# Patient Record
(demographics unavailable — no encounter records)

---

## 2024-12-03 NOTE — DATA REVIEWED
[de-identified] :  right tibia 2 views radiographs were obtained  and independently reviewed during today's visit 12/03/24 : mid shaft tibia fracture . Bones are in acceptable alignment. Joint spaces are preserved

## 2024-12-03 NOTE — HISTORY OF PRESENT ILLNESS
[FreeTextEntry1] : Truman  is a pleasant 12 yo male who came today to my office with his parents  for evaluation of right  tibia shaft fracture. He fell down on 11/27/24 while ice skating injured his right  leg t. He immediate experienced pain with any attempt of touching or weight bearing  [facility]. Xray was done and displaced  tibia shaft  fracture was diagnosed. He was placed in a long leg cast. He was instructed to follow with Peds Ortho. He came today for further management of the same , doing better and tolerated the cast very well. Denies any radiating pain, tingling, numbness in his toes

## 2024-12-03 NOTE — PHYSICAL EXAM
[FreeTextEntry1] : General: Patient is awake and alert and in no acute distress . oriented to person, place. well developed, well nourished, cooperative.   Skin: The skin is intact, warm, pink, and dry over the area examined.    Eyes: normal conjunctiva, normal eyelids and pupils were equal and round.   ENT: normal ears, normal nose and normal lips.  Cardiovascular: There is brisk capillary refill in the digits of the affected extremity. They are symmetric pulses in the bilateral upper and lower extremities, positive peripheral pulses, brisk capillary refill, but no peripheral edema.  Respiratory: The patient is in no apparent respiratory distress. They're taking full deep breaths without use of accessory muscles or evidence of audible wheezes or stridor without the use of a stethoscope, normal respiratory effort.   Neurological: 5/5 motor strength in the main muscle groups of bilateral lower extremities, sensory intact in bilateral lower extremities.   Musculoskeletal: Non weight bearing enter to the room in wheel chair and right LLC. good posture. normal clinical alignment in upper and lower extremities. full range of motion in bilateral upper and lower extremities. normal clinical alignment of the spine. RLE in a LLC  cast dry and clean. well fitted. no skin irritation from cast edges. NV intact. moves all his  toes, sensation intact, normal capillary refill.

## 2024-12-03 NOTE — ASSESSMENT
[FreeTextEntry1] : 12 yo male with right tibia shaft fracture after falling in iceskating DOI 11/27/24 Today's visit included obtaining history from the child  parent due to the child's age, the child could not be considered a reliable historian, requiring parent to act as independent historian. Xray was reviewed today demonstrating mid shaft tibia fracture . Bones are in acceptable alignment. and Long discussion was done with family regarding  diagnosis, treatment options and prognosis  conservative treatment is appropriate for his age but if fracture is shifting he may need surgery recommendations: Cast care was discussed cast for 6-8  weeks pain medication as needed Follow up in  1  weeks for Xray in cast and alignment check up Restriction from activities for 3 months. note was provided. This plan was discussed with family. Family verbalizes understanding and agreement of plan. All questions and concerns were addressed today.

## 2024-12-03 NOTE — REVIEW OF SYSTEMS
[Change in Activity] : change in activity [Fever Above 102] : no fever [Itching] : no itching [Redness] : no redness [Sore Throat] : no sore throat [Wheezing] : no wheezing [Vomiting] : no vomiting [Diarrhea] : no diarrhea [Appropriate Age Development] : development appropriate for age

## 2024-12-03 NOTE — REASON FOR VISIT
[Post ER] : a post ER visit [FreeTextEntry1] : right tibia shaft fracture [Patient] : patient [Parents] : parents

## 2024-12-10 NOTE — REVIEW OF SYSTEMS
[Change in Activity] : change in activity [Appropriate Age Development] : development appropriate for age [Fever Above 102] : no fever [Itching] : no itching [Redness] : no redness [Sore Throat] : no sore throat [Wheezing] : no wheezing [Vomiting] : no vomiting [Diarrhea] : no diarrhea

## 2024-12-10 NOTE — END OF VISIT
[FreeTextEntry3] : I, Finn Bethea MD, I personally performed the services described in the documentation, reviewed the documentation recorded by the scribe in my presence and it accurately and completely records my words and actions

## 2024-12-10 NOTE — REASON FOR VISIT
[Follow Up] : a follow up visit [Patient] : patient [Parents] : parents [FreeTextEntry1] : right tibia shaft fracture, sustained on 11/27/24

## 2024-12-10 NOTE — DATA REVIEWED
[de-identified] : Right tibia 2 views radiographs were obtained  and independently reviewed during today's visit 12/10/24 : mid shaft tibia fracture . Bones are in acceptable alignment. Joint spaces are preserved

## 2024-12-10 NOTE — PHYSICAL EXAM
[FreeTextEntry1] :  General: healthy appearing, acting appropriate for age.  HEENT: NCAT, Normal conjunctiva Cardio: Appears well perfused, no peripheral edema, brisk cap refill.  Lungs: no obvious increased WOB, no audible wheeze heard without use of stethoscope.  Abdomen: not examined.  Skin: No visible rashes on exposed skin  Musculoskeletal: Non weight bearing enter to the room in wheel chair and right LLC.   RLE in a LLC  cast dry and clean. well fitted. no skin irritation from cast edges. NV intact. moves all his  toes, sensation intact, normal capillary refill.

## 2024-12-10 NOTE — HISTORY OF PRESENT ILLNESS
[FreeTextEntry1] : Truman  is a pleasant 12 yo male with a right tibia shaft fracture. He fell down on 11/27/24 while ice skating injured his right leg. He immediate experienced pain with any attempt of touching or weight bearing.  [facility]. Xray was done and displaced tibia shaft fracture was diagnosed. He was placed in a long leg cast. He was instructed to follow with Peds Ortho. At initial visit, we recommended continuing long leg cast, and close follow/up for alignment checks.   He came today for further management of the same , doing better and tolerated the cast very well. Denies any radiating pain, tingling, numbness in his toes

## 2024-12-10 NOTE — ASSESSMENT
[FreeTextEntry1] : 10 yo male with right tibia shaft fracture after falling in iceskating DOI 11/27/24  Today's visit included obtaining history from the child  parent due to the child's age, the child could not be considered a reliable historian, requiring parent to act as independent historian. Xray was reviewed today demonstrating mid shaft tibia fracture . Bones are in acceptable alignment. and Long discussion was done with family regarding  diagnosis, treatment options and prognosis  conservative treatment is appropriate for his age but if fracture is shifting he may need surgery recommendations: Cast care was discussed, remain NWB.  cast for 6-8 weeks, we discussed long leg cast for about 4 weeks with weekly alignment checks, and then transitioning to a short leg cast.  pain medication as needed Follow up in 1 weeks for Xray in cast and alignment check up Restriction from activities for 3 months. note was provided for home instruction - patient walks to school typically, not safe for patient to ambulate on crutches to and from school. At f/u obtain XR R tibia IN CAST  This plan was discussed with family. Family verbalizes understanding and agreement of plan. All questions and concerns were addressed today.  I, Ching Rowell PA-C, acted as scribe and documented the above for Dr. Bethea.

## 2024-12-17 NOTE — HISTORY OF PRESENT ILLNESS
[FreeTextEntry1] : Truman  is a pleasant 10 yo male with a right tibia shaft fracture. He fell down on 11/27/24 while ice skating injured his right leg. He immediate experienced pain with any attempt of touching or weight bearing.  [facility]. Xray was done and displaced tibia shaft fracture was diagnosed. He was placed in a long leg cast. He was instructed to follow with Peds Ortho. At initial visit, we recommended continuing long leg cast, and close follow/up for alignment checks.  Please refer to last note from previous treatment and further details.   He came today for further management of the same , doing better and tolerated the cast very well. Denies any radiating pain, tingling, numbness in his toes. He has occasional pain on/off in the ankle, alleviated with Motrin.

## 2024-12-17 NOTE — ASSESSMENT
[FreeTextEntry1] : 10 yo male with right tibia shaft fracture after falling in ice skating DOI 11/27/24, 3  weeks ago. Today's assessment was performed with the assistance of the patient's parent as an independent historian as the patient's history is unreliable. The radiographs obtained today were reviewed with both the parent and patient confirming a right midshaft tibial fracture in acceptable alignment with minimal interval healing noted.  The recommendation at this time would be to cut a window at the heal to assure there is no pressure sore, which there isn't. He will follow up in a week for cast removal, repeat x rays and convert to a short leg NWB cast at that time.   Next appointment order Rt tib/fib x-rays AP/LAT views OOC.  We had a thorough talk in regard to the diagnosis, prognosis and treatment modalities.  All questions and concerns were addressed today. There was a verbal understanding from the parents and patient.  LUIS CARLOS Jose have acted as a scribe and documented the above information for Dr. Bethea.   This note was generated using Dragon medical dictation software. A reasonable effort has been made for proofreading its contents, however typos may still remain. If there are any questions or points of clarification needed please do not hesitate to contact my office.  The above documentation  completed by the scribe is an accurate record of both my words and actions.  Dr. Bethea. 
No signs of meconium exposure, Normal patterns of skin texture, integrity, pigmentation, color, vascularity, and perfusion; No rashes or eruptions.

## 2024-12-17 NOTE — DATA REVIEWED
[de-identified] : Right tibia 2 views radiographs were obtained and independently reviewed during today's visit 12/17/2: Mid shaft tibia fracture. Bones are in acceptable alignment. Joint spaces are preserved. Minimal interval healing noted.

## 2024-12-17 NOTE — PHYSICAL EXAM
[FreeTextEntry1] : Pleasant and cooperative with exam, appropriate for age. NWB via the right long leg cast with crutches. AAOX3  Skin: No rashes noted.  Eyes: Both conjunctiva, eyelids and pupils are present.  ENT:  Both ears, nose and lips are present. No nasal congestion.  Resp: No cough or wheezing noted.  Right NWB long leg cast is fitting well and looks clinically well aligned. The padding is intact with no signs of skin irritation. Currently no pain with passive extension of the digits.  Currently no pain within the cast. Neurologically intact with full sensation to palpation. Capillary refill less than 2 seconds. There is no swelling or lymph edema noted. (A window was cut out of the cast over the heel confirming the skin is intact with no signs of a pressure sore.  No joint instability noted with ROM testing at hip. ROM about the digits is full.   5/5 FHL, EHL, EDL intact sensation 1st webspace.

## 2024-12-17 NOTE — REASON FOR VISIT
[Follow Up] : a follow up visit [Patient] : patient [Parents] : parents [FreeTextEntry1] : right tibia shaft fracture, sustained on 11/27/24, 3 weeks ago. no

## 2024-12-24 NOTE — DATA REVIEWED
[de-identified] : Right tibia 2 views radiographs IN CAST were obtained and independently reviewed during today's visit 12/24/24: Mid shaft tibia fracture. Bones are in acceptable alignment. Joint spaces are preserved. Progressive interval healing noted.

## 2024-12-24 NOTE — HISTORY OF PRESENT ILLNESS
[FreeTextEntry1] : Truman  is a 10 yo male with a right tibia shaft fracture. He fell down on 11/27/24 while ice skating injured his right leg. He immediate experienced pain with any attempt of touching or weight bearing.  [facility]. Xray was done and displaced tibia shaft fracture was diagnosed. He was placed in a long leg cast. He was instructed to follow with Peds Ortho. At initial visit and subsequent encounters, we recommended continuing his long leg cast, and close follow/up for alignment checks.  Please refer to last note from previous treatment and further details.   He came today for further management of the same , doing better and tolerating the cast very well. Denies any radiating pain, tingling, numbness in his toes. He has occasional pain on/off in the ankle, alleviated with Motrin. He presents today for continued management of the above.

## 2024-12-24 NOTE — PHYSICAL EXAM
[FreeTextEntry1] : GENERAL: alert, cooperative, in NAD SKIN: The skin is intact, warm, pink and dry over the area examined. EYES: Normal conjunctiva, normal eyelids and pupils were equal and round. ENT: normal ears, normal nose and normal lips. CARDIOVASCULAR: brisk capillary refill, but no peripheral edema. RESPIRATORY: The patient is in no apparent respiratory distress. They're taking full deep breaths without use of accessory muscles or evidence of audible wheezes or stridor without the use of a stethoscope. Normal respiratory effort. ABDOMEN: not examined.   RLE: - Long-leg cast is in place. Good condition. removed for examination - Large fracture blister midshaft tibia noted - Mild swelling  - No swelling about the toes - Nontender about the leg - Able to fully flex and extend all toes without discomfort - No pain with passive stretch of the toes - Toes are warm and appear well perfused with brisk capillary refill - +2 DP pulse - Sensation is grossly intact to all exposed portions of the lower extremity - No evidence of lymphedema

## 2024-12-24 NOTE — ASSESSMENT
[FreeTextEntry1] : 10 yo male with right tibia shaft fracture after falling in ice skating DOI 11/27/24, 4  weeks ago  -We discussed the interval progress, physical exam, and all available radiographs at length during today's visit with patient and his parent/guardian who served as an independent historian due to child's age and unreliable nature of history. - Right tibia 2 views radiographs were obtained and independently reviewed during today's visit 12/24/24: Mid shaft tibia fracture. Bones are in acceptable alignment. Joint spaces are preserved. Progressive interval healing noted. -The etiology, pathoanatomy, treatment modalities, and expected natural history of the injury were discussed at length today. -Clinically, he is doing very well and tolerating his long leg without difficulty. He denies any pain in the cast at this time. -His long leg cast was removed today, he tolerated the procedure well. He was then transitioned to a well padded short leg cast. Cast care instructions were reviewed - He can now work on knee range of motion. -Nonweightbearing on the right lower extremity.  Crutches for mobilization -OTC NSAIDs as needed -No gym, recess, sports, rough play.  School note provided today. -We will plan to see him back in clinic in approximately 2 weeks for reevaluation and new right tibia radiographs OUT OF CAST. Anticipate transition to a CAM boot at that time. Patient was instructed to obtain the boot prior to next visit.  All questions and concerns were addressed today. Parent and patient verbalize understanding and agree with plan of care.   I, Sarah Glaser, have acted as a scribe and documented the above information for Dr. Bethea   This note was created using Dragon Voice Recognition Software and may have been partially created using [employer] software which was then reviewed and edited to the best of my ability. Sporadic inaccurate translation may have occurred. If there are any questions about content of the note, please contact the office for clarification.

## 2024-12-24 NOTE — REVIEW OF SYSTEMS
[Change in Activity] : change in activity [Appropriate Age Development] : development appropriate for age [Joint Pains] : arthralgias [Fever Above 102] : no fever [Itching] : no itching [Redness] : no redness [Sore Throat] : no sore throat [Wheezing] : no wheezing [Vomiting] : no vomiting [Diarrhea] : no diarrhea none

## 2025-01-14 NOTE — REVIEW OF SYSTEMS
[Change in Activity] : change in activity [Fever Above 102] : no fever [Itching] : no itching [Redness] : no redness [Sore Throat] : no sore throat [Wheezing] : no wheezing [Vomiting] : no vomiting [Diarrhea] : no diarrhea [Joint Pains] : no arthralgias [Joint Swelling] : no joint swelling [Muscle Aches] : muscle aches [Appropriate Age Development] : development appropriate for age

## 2025-01-14 NOTE — ASSESSMENT
[FreeTextEntry1] : 12 yo male with right tibia shaft fracture after falling in ice skating DOI 11/27/24, 7   weeks ago Today's visit included obtaining history from the child  parent due to the child's age, the child could not be considered a reliable historian, requiring parent to act as independent historian. Xray was reviewed today demonstrating progressive interval healing  and Long discussion was done with family regarding  diagnosis, treatment options and prognosis we converted him today to walking cam boot  He will start weight bearing with crutches No gym/sport  He will start PT for gait training  and ankle ROM Follow up in 3 weeks for reevaluation  This plan was discussed with family. Family verbalizes understanding and agreement of plan. All questions and concerns were addressed today.

## 2025-01-14 NOTE — HISTORY OF PRESENT ILLNESS
[FreeTextEntry1] : Truman  is a 10 yo male with a right tibia shaft fracture. He fell down on 11/27/24 while ice skating injured his right leg. He immediate experienced pain with any attempt of touching or weight bearing.  [facility]. Xray was done and displaced tibia shaft fracture was diagnosed. He was placed in a long leg cast. He was instructed to follow with Peds Ortho. At initial visit and subsequent encounters, we recommended continuing his long leg cast, and close follow/up for alignment checks.  Please refer to last note from previous treatment and further details.  Last visit we converted him into a NWN SLC and she was instructed to remain out of gym/sport. He came today for further management of the same , doing better and tolerating the cast very well. Denies any radiating pain, tingling, numbness in his toes. He presents today for continued management of the above.

## 2025-01-14 NOTE — PHYSICAL EXAM
[FreeTextEntry1] : GENERAL: alert, cooperative, in NAD SKIN: The skin is intact, warm, pink and dry over the area examined. EYES: Normal conjunctiva, normal eyelids and pupils were equal and round. ENT: normal ears, normal nose and normal lips. CARDIOVASCULAR: brisk capillary refill, but no peripheral edema. RESPIRATORY: The patient is in no apparent respiratory distress. They're taking full deep breaths without use of accessory muscles or evidence of audible wheezes or stridor without the use of a stethoscope. Normal respiratory effort. ABDOMEN: not examined.   RLE: upon removal the cast: resolving of the swelling, very mild tenderness above fracture site. limited ankle  ROM d/t cast immobilization. NV intact, moves all toes, hand worm and pink with brisk capillary refill . sever calf atrophy   refill.

## 2025-01-14 NOTE — DATA REVIEWED
[de-identified] : Right tibia 2 views radiographs  were obtained and independently reviewed during today's visit  01/14/25 : Mid shaft tibia fracture. Bones are in acceptable alignment. Joint spaces are preserved. Progressive interval healing noted.

## 2025-01-14 NOTE — REASON FOR VISIT
[Follow Up] : a follow up visit [FreeTextEntry1] : right tibia shaft fracture, sustained on 11/27/24 [Patient] : patient [Parents] : parents

## 2025-02-04 NOTE — PHYSICAL EXAM
[FreeTextEntry1] : Pleasant and cooperative with exam, appropriate for age. Ambulates full WB with the CAM walker and assistance of crutches. AAOX3  Skin: No rashes noted.  Eyes: Both conjunctiva, eyelids and pupils are present.  ENT:  Both ears, nose and lips are present. No nasal congestion.  Resp: No cough or wheezing noted.  Right lower leg: Full active and passive extension of flexion of the knee and ankle.  4\5 muscle strength with calf atrophy noted.  There is no discomfort elicited with palpation over the tibial shaft/fracture site.  Neurologically intact with full sensation to palpation. 2+ pulses palpated in the extremity. Capillary refill less than 2 seconds in all digits.

## 2025-02-04 NOTE — ASSESSMENT
[FreeTextEntry1] : 12 yo male with right tibia shaft fracture after falling in ice skating DOI 11/27/24, 10 weeks ago. Today's assessment was performed with the assistance of the patient's parent as an independent historian as the patient's history is unreliable. The radiographs obtained today were reviewed with both the parent and patient confirming a well aligned healing tibial shaft fracture.  The recommendation at this time would be to continue P.T. and the WB CAM walker. He will follow up in a month for a repeat examination and x rays.  Next appointment order Rt tib/fib x-rays AP/LAT views OOB.  We had a thorough talk in regard to the diagnosis, prognosis and treatment modalities.  All questions and concerns were addressed today. There was a verbal understanding from the parents and patient.   LUIS CARLOS Jose have acted as a scribe and documented the above information for Dr. Bethea.   This note was generated using Dragon medical dictation software. A reasonable effort has been made for proofreading its contents; however, typos may still remain. If there are any questions or points of clarification needed, please do not hesitate to contact my office.   The above documentation completed by the scribe is an accurate record of both my words and actions.   Dr. Bethea.

## 2025-02-04 NOTE — DATA REVIEWED
[de-identified] : Right tibia 2 views radiographs were obtained and independently reviewed during today's visit 02/4/25 : Mid shaft tibia fracture. Bones are in acceptable alignment. Joint spaces are preserved. progressive interval healing

## 2025-02-04 NOTE — HISTORY OF PRESENT ILLNESS
[FreeTextEntry1] : Truman  is a 12 yo male with a right tibia shaft fracture. He fell down on 11/27/24 while ice skating injured his right leg. He immediate experienced pain with any attempt of touching or weight bearing.  [facility]. Xray was done and displaced tibia shaft fracture was diagnosed. He was placed in a long leg cast. He was instructed to follow with Peds Ortho. At initial visit and subsequent encounters, we recommended continuing his long leg cast, and close follow/up for alignment checks.  Please refer to last note from previous treatment and further details.  Last visit we converted him into a NWN SLC and she was instructed to remain out of gym/sport. He came today for further management of the same , doing better and tolerating the cast very well. Denies any radiating pain, tingling, numbness in his toes. He presents today for continued management of the above. Please refer to last note from previous treatment and further details.  Today, Truman presents to the office weightbearing in a cam walker with the assistance of crutches 10 weeks status post sustaining his right tibial shaft fracture on 11/27/2024.  He is doing quite well.  He is doing physical therapy twice a week responding well with increased range of motion and strength.  Presents today for repeat exam and x-rays.

## 2025-02-04 NOTE — REVIEW OF SYSTEMS
[Change in Activity] : no change in activity [Fever Above 102] : no fever [Itching] : no itching [Redness] : no redness [Sore Throat] : no sore throat [Wheezing] : no wheezing [Vomiting] : no vomiting [Diarrhea] : no diarrhea [Limping] : limping [Joint Swelling] : no joint swelling [Appropriate Age Development] : development appropriate for age

## 2025-02-04 NOTE — REASON FOR VISIT
[Follow Up] : a follow up visit [Patient] : patient [Mother] : mother [FreeTextEntry1] : right tibia shaft fracture, sustained on 11/27/24, 10 weeks ago.

## 2025-03-04 NOTE — ASSESSMENT
[FreeTextEntry1] : 13 yo male with right tibia shaft fracture after falling in ice skating DOI 11/27/24, 13 weeks ago. Today's assessment was performed with the assistance of the patient's parent as an independent historian as the patient's history is unreliable. The radiographs obtained today were reviewed with both the parent and patient confirming a well aligned healing tibial shaft fracture.  The recommendation at this time would be to continue P.T. and the WB CAM walker. He will follow up in a month for a repeat examination and x rays.  Next appointment order Rt tib/fib x-rays AP/LAT views OOB.  We had a thorough talk in regard to the diagnosis, prognosis and treatment modalities.  All questions and concerns were addressed today. There was a verbal understanding from the parents and patient.   LUIS CARLOS Jose have acted as a scribe and documented the above information for Dr. Bethea.   This note was generated using Dragon medical dictation software. A reasonable effort has been made for proofreading its contents; however, typos may still remain. If there are any questions or points of clarification needed, please do not hesitate to contact my office.   The above documentation completed by the scribe is an accurate record of both my words and actions.   Dr. Bethea.

## 2025-03-04 NOTE — REASON FOR VISIT
[Follow Up] : a follow up visit [FreeTextEntry1] : right tibia shaft fracture, sustained on 11/27/24,  [Patient] : patient [Mother] : mother

## 2025-03-04 NOTE — PHYSICAL EXAM
[FreeTextEntry1] : Pleasant and cooperative with exam, appropriate for age. Ambulates full WB with the CAM walker and assistance of crutches. AAOX3  Skin: No rashes noted.  Eyes: Both conjunctiva, eyelids and pupils are present.  ENT:  Both ears, nose and lips are present. No nasal congestion.  Resp: No cough or wheezing noted.  Right lower leg: Full active and passive extension of flexion of the knee and ankle.  4 5 muscle strength with calf atrophy noted.  There is no discomfort elicited with palpation over the tibial shaft/fracture site.  Neurologically intact with full sensation to palpation. 2+ pulses palpated in the extremity. Capillary refill less than 2 seconds in all digits.

## 2025-03-04 NOTE — HISTORY OF PRESENT ILLNESS
[FreeTextEntry1] : Truman  is a 13 yo male with a right tibia shaft fracture. He fell down on 11/27/24 while ice skating injured his right leg. He immediate experienced pain with any attempt of touching or weight bearing.  [facility]. Xray was done and displaced tibia shaft fracture was diagnosed. He was placed in a long leg cast. He was instructed to follow with Peds Ortho. At initial visit and subsequent encounters, we recommended continuing his long leg cast, and close follow/up for alignment checks.  Please refer to last note from previous treatment and further details.  Last visit we converted him into a NWN SLC and she was instructed to remain out of gym/sport. He came today for further management of the same , doing better and tolerating the cast very well. Denies any radiating pain, tingling, numbness in his toes. He presents today for continued management of the above. Please refer to last note from previous treatment and further details.  Today, Truman presents to the office weightbearing in a cam walker with the assistance of crutches status post sustaining his right tibial shaft fracture on 11/27/2024.  He is doing quite well.  He is doing physical therapy twice a week responding well with increased range of motion and strength.  Presents today for repeat exam and x-rays.

## 2025-03-04 NOTE — DATA REVIEWED
[de-identified] : Right tibia 2 views radiographs were obtained and independently reviewed during today's visit 03/4/25 : Mid shaft tibia fracture. Bones are in acceptable alignment. Joint spaces are preserved. progressive interval healing

## 2025-04-01 NOTE — HISTORY OF PRESENT ILLNESS
[FreeTextEntry1] : Truman  is a 11 yo male with a right tibia shaft fracture. He fell down on 11/27/24 while ice skating injured his right leg. He immediate experienced pain with any attempt of touching or weight bearing.  [facility]. Xray was done and displaced tibia shaft fracture was diagnosed. He was placed in a long leg cast. He was instructed to follow with Peds Ortho. At initial visit and subsequent encounters, we recommended continuing his long leg cast, and close follow/up for alignment checks.  Please refer to last note from previous treatment and further details.  Last visit we converted him into a NWN SLC and she was instructed to remain out of gym/sport. He came today for further management of the same , doing better and tolerating the cast very well. Denies any radiating pain, tingling, numbness in his toes. He presents today for continued management of the above. Please refer to last note from previous treatment and further details.  Today, Truman presents to the office weightbearing in a cam walker   He is doing quite well.  He is doing physical therapy twice a week responding well with increased range of motion and strength.  Presents today for repeat exam and x-rays.

## 2025-04-01 NOTE — DATA REVIEWED
[de-identified] : Right tibia 2 views radiographs were obtained and independently reviewed during today's visit 04/01/25  : Mid shaft tibia fracture. Bones are in acceptable alignment. Joint spaces are preserved. progressive interval healing

## 2025-04-01 NOTE — ASSESSMENT
[FreeTextEntry1] : 11 yo male with right tibia shaft fracture after falling in ice skating [place] 11/27/24,  Today's assessment was performed with the assistance of the patient's parent as an independent historian as the patient's history is unreliable. The radiographs obtained today were reviewed with both the parent and patient confirming a well aligned healing tibial shaft fracture.  The recommendation at this time would be to continue P.T. and wean the  CAM walker. He will follow up in a month for a repeat examination and x rays.  Next appointment order Rt tib/fib x-rays AP/LAT  A prescription was provided today. We will plan to see him back after physical therapy to reevaluate a potentially cleared for activities.This plan was discussed with family. Family verbalizes understanding and agreement of plan. All questions and concerns were addressed today.

## 2025-04-01 NOTE — REVIEW OF SYSTEMS
[Change in Activity] : no change in activity [Fever Above 102] : no fever [Itching] : no itching [Redness] : no redness [Sore Throat] : no sore throat [Wheezing] : no wheezing [Vomiting] : no vomiting [Diarrhea] : no diarrhea [Limping] : limping [Joint Pains] : no arthralgias [Joint Swelling] : no joint swelling [Appropriate Age Development] : development appropriate for age

## 2025-05-01 NOTE — ASSESSMENT
[FreeTextEntry1] : 11 yo male with right tibia shaft fracture after falling in ice skating [place] 11/27/24,  Today's assessment was performed with the assistance of the patient's parent as an independent historian as the patient's history is unreliable. The radiographs obtained today were reviewed with both the parent and patient confirming a well aligned healing tibial shaft fracture.  The recommendation at this time would be to continue P.T. and  start light activities No contact sport yet  He will follow up in a month for a repeat examination   A prescription was provided today. We will plan to see him back after physical therapy to reevaluate a potentially cleared for activities.This plan was discussed with family. Family verbalizes understanding and agreement of plan. All questions and concerns were addressed today.

## 2025-05-01 NOTE — HISTORY OF PRESENT ILLNESS
[FreeTextEntry1] : Truman  is a 11 yo male with a right tibia shaft fracture. He fell down on 11/27/24 while ice skating injured his right leg. He immediate experienced pain with any attempt of touching or weight bearing.  [facility]. Xray was done and displaced tibia shaft fracture was diagnosed. He was placed in a long leg cast. He was instructed to follow with Peds Ortho. At initial visit and subsequent encounters, we recommended continuing his long leg cast, and close follow/up for alignment checks.  Please refer to last note from previous treatment and further details.  Last visit we converted him into a NWN SLC and she was instructed to remain out of gym/sport. He came today for further management of the same , doing better and tolerating the cast very well. Denies any radiating pain, tingling, numbness in his toes. He presents today for continued management of the above. Please refer to last note from previous treatment and further details.  Today, Truman presents to the office weightbearing in regular snicker   He is doing quite well.  He is doing physical therapy twice a week responding well with increased range of motion and strength.  Presents today for repeat exam and x-rays.

## 2025-05-01 NOTE — DATA REVIEWED
[de-identified] : Right tibia 2 views radiographs were obtained and independently reviewed during today's visit 05/01/25  : Mid shaft tibia fracture. Bones are in acceptable alignment. Joint spaces are preserved. progressive interval remodeling